# Patient Record
(demographics unavailable — no encounter records)

---

## 2024-10-21 NOTE — ASSESSMENT
[FreeTextEntry1] : Mr. ANGELITA LANDIS is a 51 year old man with PMHx of HTN, HLD and LAD myocardial bridging who is presenting for follow up of chest pain. Give normal CCTA in 2022 (except for myocardial bridging), have low suspicion for CAD. A more likely cause of his symptoms is his uncontrolled BP.  -Order TTE to rule out structural changes -Order exercise nuclear stress test to rule out ischemia  -Increase losartan to 100mg daily for HTN. Suspect that he will need 2nd agent, but will see back in the office to reassess. Defer olmesartan due to possible cost. -Could attempt CCB to treat HTN and myocardial bridging -Encouraged improved lifestyle modifications with these recommendations below: -Plant-based and Mediterranean diets, along with increased fruit, nut, vegetable, legume, and lean vegetable or animal protein (preferably fish) consumption -Engage in at least 150 minutes per week of accumulated moderate-intensity aerobic physical activity or 75 minutes per week of vigorous-intensity aerobic physical activity  Time in encounter, including face to face visit and time reviewing chart: 36  minutes  Arvind Prasad MD Non-Invasive Cardiology [address], [address] Office: [phone]

## 2024-10-21 NOTE — ASSESSMENT
[FreeTextEntry1] : Mr. ANGELITA LANDIS is a 51 year old man with PMHx of HTN, HLD and LAD myocardial bridging who is presenting for follow up of chest pain. Give normal CCTA in 2022 (except for myocardial bridging), have low suspicion for CAD. A more likely cause of his symptoms is his uncontrolled BP.  -Order TTE to rule out structural changes -Order exercise nuclear stress test to rule out ischemia  -Increase losartan to 100mg daily for HTN. Suspect that he will need 2nd agent, but will see back in the office to reassess. Defer olmesartan due to possible cost. -Could attempt CCB to treat HTN and myocardial bridging -Encouraged improved lifestyle modifications with these recommendations below: -Plant-based and Mediterranean diets, along with increased fruit, nut, vegetable, legume, and lean vegetable or animal protein (preferably fish) consumption -Engage in at least 150 minutes per week of accumulated moderate-intensity aerobic physical activity or 75 minutes per week of vigorous-intensity aerobic physical activity  Time in encounter, including face to face visit and time reviewing chart: 36  minutes  Arvind Prasad MD Non-Invasive Cardiology [address], [address] Office: [phone]

## 2024-10-21 NOTE — REASON FOR VISIT
[Symptom and Test Evaluation] : symptom and test evaluation [CV Risk Factors and Non-Cardiac Disease] : CV risk factors and non-cardiac disease [Hyperlipidemia] : hyperlipidemia [FreeTextEntry1] : Mr. ANGELITA LANDIS is a 51 year old man with PMHx of HTN, HLD and LAD myocardial bridging who is presenting for follow up of chest pain. He has had chest pain off and on for a few years. He was seen 2 years ago for atypical chest pain. He underwent ETT echo which was normal. He later underwent CCTA, which was normal except for LAD myocardial bridging. More recently, he has had chest pain and SOB with exertion. The pain and dyspnea are not consistent- he does not develop symptoms every time he exerts himself.  He is very active at work at the [employer].   CCTA 11/11/22 IMPRESSION: No coronary stenosis identified.  CAD-RADS 0. Proximal LAD short segment myocardial bridge up to approximately 5 to 6 mm deep spanning approximately 20 mm. The total Agatston coronary artery calcium score equals 0.

## 2024-10-21 NOTE — REASON FOR VISIT
[Symptom and Test Evaluation] : symptom and test evaluation [CV Risk Factors and Non-Cardiac Disease] : CV risk factors and non-cardiac disease [Hyperlipidemia] : hyperlipidemia [FreeTextEntry1] : Mr. ANGELITA LANDIS is a 51 year old man with PMHx of HTN, HLD and LAD myocardial bridging who is presenting for follow up of chest pain. He has had chest pain off and on for a few years. He was seen 2 years ago for atypical chest pain. He underwent ETT echo which was normal. He later underwent CCTA, which was normal except for LAD myocardial bridging. More recently, he has had chest pain and SOB with exertion. The pain and dyspnea are not consistent- he does not develop symptoms every time he exerts himself.  He is very active at work at the [employer].   CCTA 11/11/22 IMPRESSION: No coronary stenosis identified.  CAD-RADS 0. Proximal LAD short segment myocardial bridge up to approximately 5 to 6 mm deep spanning approximately 20 mm. The total Agatston coronary artery calcium score equals 0.